# Patient Record
(demographics unavailable — no encounter records)

---

## 2025-04-26 NOTE — HISTORY OF PRESENT ILLNESS
[FreeTextEntry1] : Patient has no history of congenital heart disease or childhood cardiac illnesses  Her mother had BAV -. pt had screening echo in her tens -. thought she had BAV -. later, it was demonstrated otherwise -. last echo was about 3 ya  She suffers from anxiety and past depression -. seeing therapist/psychiatrist She's had elevated BP in doctor's offices -. thought from anxiety She used to have palpitations at 14-14 y a-. had monitor -. determined from anxiety  Lately, she's been having fluttering, skipped beats, almost daily recently -. felt in the throat Recently, she was also having frequent and severe headaches   Avoiding caffeine -. both symptoms above are better  She had CP about a ya, at rest -. not lately Saw pulmonary -. unremarkable  Lately, no CP, SOB, palpitations or dizziness